# Patient Record
Sex: FEMALE | Race: WHITE | NOT HISPANIC OR LATINO | Employment: UNEMPLOYED | URBAN - METROPOLITAN AREA
[De-identification: names, ages, dates, MRNs, and addresses within clinical notes are randomized per-mention and may not be internally consistent; named-entity substitution may affect disease eponyms.]

---

## 2020-08-25 ENCOUNTER — PATIENT MESSAGE (OUTPATIENT)
Dept: OBSTETRICS AND GYNECOLOGY | Facility: CLINIC | Age: 20
End: 2020-08-25

## 2020-08-25 RX ORDER — TINIDAZOLE 500 MG/1
2 TABLET ORAL
Qty: 8 TABLET | Refills: 0 | Status: SHIPPED | OUTPATIENT
Start: 2020-08-25 | End: 2020-08-27

## 2020-08-25 NOTE — TELEPHONE ENCOUNTER
Called patient about coming in earlier for her appointment. She said she would rather reschedule due to the weather and she lives an hour away. She is requesting medication for BV. Reports having a vaginal discharge with an odor. Pt is scheduled for next week for her annual.

## 2020-08-27 RX ORDER — TINIDAZOLE 500 MG/1
2 TABLET ORAL
Qty: 8 TABLET | Refills: 0 | Status: SHIPPED | OUTPATIENT
Start: 2020-08-27 | End: 2020-08-29

## 2023-09-07 ENCOUNTER — PATIENT MESSAGE (OUTPATIENT)
Dept: OBSTETRICS AND GYNECOLOGY | Facility: CLINIC | Age: 23
End: 2023-09-07
Payer: COMMERCIAL

## 2023-11-27 ENCOUNTER — TELEPHONE (OUTPATIENT)
Dept: OBSTETRICS AND GYNECOLOGY | Facility: CLINIC | Age: 23
End: 2023-11-27
Payer: COMMERCIAL

## 2023-11-27 NOTE — TELEPHONE ENCOUNTER
Called patient to discuss scheduling a consultation, she declined and said that she would like to call around to find a provider who will be willing to do a .       Lois

## 2023-11-27 NOTE — TELEPHONE ENCOUNTER
"----- Message from Delores Kathleen MA sent at 11/27/2023  1:35 PM CST -----  Regarding: FW: Return Call  Contact: patient  I contacted this patient and she informed me that she "does not want to see Dr. Bryant" and was unaware that her information was being passed to another provider. She only wants to schedule with Dr. Goodman. Please contact the patient to sort out this issue. Thank you!   ----- Message -----  From: Zena Dorsey MA  Sent: 11/27/2023  11:07 AM CST  To: Annette Haley Staff  Subject: FW: Return Call                                    ----- Message -----  From: Enedelia Berry  Sent: 11/27/2023  11:07 AM CST  To: Rex Woo Staff  Subject: Return Call                                      Type:  Patient Returning Call    Who Called:Mark   Who Left Message for Patient: nurse   Does the patient know what this is regarding?:an appointment   Would the patient rather a call back or a response via [facility]?  Call back   Best Call Back Number: [phone] (home)     Additional Information: OB Visit--The caller was referred by Dr Karey Isabel    Thanks,  SJ          "

## 2025-08-28 ENCOUNTER — OUTSIDE PLACE OF SERVICE (OUTPATIENT)
Facility: CLINIC | Age: 25
End: 2025-08-28
Payer: MEDICAID